# Patient Record
Sex: FEMALE | Race: WHITE | NOT HISPANIC OR LATINO
[De-identification: names, ages, dates, MRNs, and addresses within clinical notes are randomized per-mention and may not be internally consistent; named-entity substitution may affect disease eponyms.]

---

## 2019-11-19 ENCOUNTER — OTHER (OUTPATIENT)
Age: 69
End: 2019-11-19

## 2024-08-06 ENCOUNTER — NON-APPOINTMENT (OUTPATIENT)
Age: 74
End: 2024-08-06

## 2024-08-06 ENCOUNTER — APPOINTMENT (OUTPATIENT)
Dept: HEART AND VASCULAR | Facility: CLINIC | Age: 74
End: 2024-08-06

## 2024-08-06 PROBLEM — I10 ESSENTIAL HYPERTENSION: Status: ACTIVE | Noted: 2024-08-06

## 2024-08-06 PROBLEM — R60.0 BILATERAL EDEMA OF LOWER EXTREMITY: Status: ACTIVE | Noted: 2024-08-06

## 2024-08-06 PROBLEM — I47.19 PAT (PAROXYSMAL ATRIAL TACHYCARDIA): Status: ACTIVE | Noted: 2024-08-06

## 2024-08-06 PROBLEM — E78.2 HYPERLIPIDEMIA, MIXED: Status: ACTIVE | Noted: 2024-08-06

## 2024-08-06 PROCEDURE — 99204 OFFICE O/P NEW MOD 45 MIN: CPT

## 2024-08-06 PROCEDURE — 93000 ELECTROCARDIOGRAM COMPLETE: CPT

## 2024-08-09 NOTE — HISTORY OF PRESENT ILLNESS
[FreeTextEntry1] : 75 y/o F with  a mixed HLD and recent onset HTN.  She notes palps and a 6 day ZIO reveals 211 episodes of PAT.  MD written AT is possibly A Fib.  Full report obtained.  AT is regular and when the baseline is clear, atrial activity is noted, doubt A Fib.  A stress test was normal but pt reached 111 % of her APMHR in Stage I.   EKG: NSR, normal axis and intervals, no ST-Tw abnormalities.

## 2024-08-09 NOTE — ASSESSMENT
[FreeTextEntry1] : HTN-  BP reasonable  PAT- Need all 11  pages to see tracings and see if AF is present.  I favor EP consult for  arrhythmia management as minimal HR is 44. Full report obtained. AT is regular and when the baseline is clear, atrial activity is noted, doubt A Fib.  A stress test was normal but pt reached 111 % of her APMHR in Stage I. Echo was normal.  Edema-  Check TFTs and UA.  Labs were drawn today in Office.  Elevation, support hose.  Torsemide + K given by her Cardiologist.  Rec QOD alternating with Dyazide.   TFTs and UA are NL.